# Patient Record
Sex: FEMALE | URBAN - METROPOLITAN AREA
[De-identification: names, ages, dates, MRNs, and addresses within clinical notes are randomized per-mention and may not be internally consistent; named-entity substitution may affect disease eponyms.]

---

## 2019-07-22 ENCOUNTER — APPOINTMENT (RX ONLY)
Dept: URBAN - METROPOLITAN AREA CLINIC 44 | Facility: CLINIC | Age: 39
Setting detail: DERMATOLOGY
End: 2019-07-22

## 2019-07-22 DIAGNOSIS — L81.1 CHLOASMA: ICD-10-CM

## 2019-07-22 DIAGNOSIS — L30.9 DERMATITIS, UNSPECIFIED: ICD-10-CM

## 2019-07-22 PROCEDURE — ? ADDITIONAL NOTES

## 2019-07-22 PROCEDURE — ? COUNSELING

## 2019-07-22 PROCEDURE — 99213 OFFICE O/P EST LOW 20 MIN: CPT

## 2019-07-22 ASSESSMENT — LOCATION ZONE DERM
LOCATION ZONE: FACE
LOCATION ZONE: TRUNK

## 2019-07-22 ASSESSMENT — LOCATION SIMPLE DESCRIPTION DERM
LOCATION SIMPLE: RIGHT UPPER BACK
LOCATION SIMPLE: RIGHT CHEEK
LOCATION SIMPLE: LEFT CHEEK

## 2019-07-22 ASSESSMENT — LOCATION DETAILED DESCRIPTION DERM
LOCATION DETAILED: LEFT CENTRAL MALAR CHEEK
LOCATION DETAILED: RIGHT SUPERIOR MEDIAL UPPER BACK
LOCATION DETAILED: RIGHT CENTRAL MALAR CHEEK

## 2019-07-22 NOTE — PROCEDURE: ADDITIONAL NOTES
Additional Notes: Ddx includes notalgia paresthetica vs ashy dermatitis. Patient declined biopsy and treatment.
Detail Level: Simple
Additional Notes: Patient declined treatment

## 2021-01-21 ENCOUNTER — WEB ENCOUNTER (OUTPATIENT)
Dept: URBAN - NONMETROPOLITAN AREA CLINIC 4 | Facility: CLINIC | Age: 41
End: 2021-01-21

## 2021-01-21 ENCOUNTER — OFFICE VISIT (OUTPATIENT)
Dept: URBAN - NONMETROPOLITAN AREA CLINIC 4 | Facility: CLINIC | Age: 41
End: 2021-01-21
Payer: COMMERCIAL

## 2021-01-21 VITALS
DIASTOLIC BLOOD PRESSURE: 69 MMHG | SYSTOLIC BLOOD PRESSURE: 100 MMHG | WEIGHT: 117 LBS | HEART RATE: 78 BPM | HEIGHT: 58 IN | BODY MASS INDEX: 24.56 KG/M2 | TEMPERATURE: 97.5 F

## 2021-01-21 DIAGNOSIS — R14.0 BLOATING: ICD-10-CM

## 2021-01-21 DIAGNOSIS — R10.10 UPPER ABDOMINAL PAIN: ICD-10-CM

## 2021-01-21 PROCEDURE — 99214 OFFICE O/P EST MOD 30 MIN: CPT | Performed by: INTERNAL MEDICINE

## 2021-01-21 PROCEDURE — 74177 CT ABD & PELVIS W/CONTRAST: CPT | Performed by: INTERNAL MEDICINE

## 2021-01-21 NOTE — HPI-TODAY'S VISIT:
Pt reports that she had dizziness, spinning, abdominal bloating and upper abd pain on the left upper abdomen that began before Xmas.  Pt reports that she started at the time of eating omelet with trent. Did have smoked sausage later and started burping and bloating.  Pt reports that she does still have her GB.  Pt reports that she took omeprazole and had worsening bloatingl  Pt eliminated coffee with no improvement.  Denies constipation

## 2021-01-22 ENCOUNTER — TELEPHONE ENCOUNTER (OUTPATIENT)
Dept: URBAN - METROPOLITAN AREA CLINIC 54 | Facility: CLINIC | Age: 41
End: 2021-01-22

## 2021-01-28 LAB
ADDITIONAL INFORMATION:: (no result)
AMYLASE: 66
COMMENT:: (no result)
DQ2 (DQA1 0501/0505,DQB1 02XX): POSITIVE
DQ8 (DQA1 03XX, DQB1 0302): NEGATIVE
LIPASE: 45

## 2021-01-31 LAB — H. PYLORI STOOL AG, EIA: NEGATIVE

## 2021-03-04 ENCOUNTER — OFFICE VISIT (OUTPATIENT)
Dept: URBAN - NONMETROPOLITAN AREA CLINIC 4 | Facility: CLINIC | Age: 41
End: 2021-03-04

## 2021-04-29 ENCOUNTER — OFFICE VISIT (OUTPATIENT)
Dept: URBAN - NONMETROPOLITAN AREA CLINIC 4 | Facility: CLINIC | Age: 41
End: 2021-04-29

## 2022-08-08 ENCOUNTER — DASHBOARD ENCOUNTERS (OUTPATIENT)
Age: 42
End: 2022-08-08

## 2022-08-08 ENCOUNTER — OFFICE VISIT (OUTPATIENT)
Dept: URBAN - METROPOLITAN AREA CLINIC 54 | Facility: CLINIC | Age: 42
End: 2022-08-08
Payer: COMMERCIAL

## 2022-08-08 VITALS
SYSTOLIC BLOOD PRESSURE: 102 MMHG | HEART RATE: 80 BPM | WEIGHT: 122.4 LBS | HEIGHT: 58 IN | DIASTOLIC BLOOD PRESSURE: 71 MMHG | TEMPERATURE: 96.8 F | BODY MASS INDEX: 25.69 KG/M2

## 2022-08-08 DIAGNOSIS — K82.4 GALLBLADDER POLYP: ICD-10-CM

## 2022-08-08 DIAGNOSIS — K90.0 CELIAC DISEASE: ICD-10-CM

## 2022-08-08 DIAGNOSIS — R10.13 DYSPEPSIA: ICD-10-CM

## 2022-08-08 DIAGNOSIS — K58.0 IRRITABLE BOWEL SYNDROME WITH DIARRHEA: ICD-10-CM

## 2022-08-08 PROBLEM — 197125005: Status: ACTIVE | Noted: 2022-08-08

## 2022-08-08 PROCEDURE — 99214 OFFICE O/P EST MOD 30 MIN: CPT | Performed by: INTERNAL MEDICINE

## 2022-08-08 NOTE — HPI-TODAY'S VISIT:
42-year-old lady with possible celiac disease on gluten-free diet who presents with chronic abdominal pain.  Patient reports that she said abdominal epigastric pain for last 5 years.  Its intermittent.  She denies that is postprandial.  She recently had an abdominal ultrasound that showed a 4 mm gallbladder polyp.  This was performed June 21, 2022.  Patient also reports that she has alternating bowel habits.  Her normal baseline bowel frequency is 1 to 2/day.  It has increased in frequency to 4 to 5/day of loose liquidy stool.  She denies any blood or black tarry stool.  Patient's been on a gluten-free diet for many years.  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  Patient's last colonoscopy and EGD were reportedly in 2011.

## 2022-08-09 PROBLEM — 396331005: Status: ACTIVE | Noted: 2022-08-08

## 2022-08-26 LAB
A/G RATIO: 2
ALBUMIN: 4.5
ALKALINE PHOSPHATASE: 43
ALT (SGPT): 10
AST (SGOT): 13
BILIRUBIN, TOTAL: 0.5
BUN/CREATININE RATIO: (no result)
BUN: 14
CALCIUM: 9.5
CARBON DIOXIDE, TOTAL: 25
CHLORIDE: 103
CREATININE: 0.65
EGFR: 113
GLOBULIN, TOTAL: 2.3
GLUCOSE: 99
HEMATOCRIT: 39.2
HEMOGLOBIN: 13
IMMUNOGLOBULIN A, QN, SERUM: 123
INTERPRETATION: (no result)
MCH: 29.9
MCHC: 33.2
MCV: 90.1
MPV: 10
PLATELET COUNT: 307
POTASSIUM: 4.4
PROTEIN, TOTAL: 6.8
RDW: 11.7
RED BLOOD CELL COUNT: 4.35
SODIUM: 138
T-TRANSGLUTAMINASE (TTG) IGA: <1
TSH: 1.25
WHITE BLOOD CELL COUNT: 5.2

## 2022-08-29 ENCOUNTER — OFFICE VISIT (OUTPATIENT)
Dept: URBAN - METROPOLITAN AREA MEDICAL CENTER 48 | Facility: MEDICAL CENTER | Age: 42
End: 2022-08-29
Payer: COMMERCIAL

## 2022-08-29 DIAGNOSIS — K82.8 ADENOMYOSIS OF GALLBLADDER: ICD-10-CM

## 2022-08-29 DIAGNOSIS — K29.60 ADENOPAPILLOMATOSIS GASTRICA: ICD-10-CM

## 2022-08-29 PROCEDURE — 43259 EGD US EXAM DUODENUM/JEJUNUM: CPT | Performed by: INTERNAL MEDICINE

## 2022-08-29 PROCEDURE — 43239 EGD BIOPSY SINGLE/MULTIPLE: CPT | Performed by: INTERNAL MEDICINE

## 2022-08-30 ENCOUNTER — TELEPHONE ENCOUNTER (OUTPATIENT)
Dept: URBAN - METROPOLITAN AREA CLINIC 54 | Facility: CLINIC | Age: 42
End: 2022-08-30

## 2022-10-03 ENCOUNTER — WEB ENCOUNTER (OUTPATIENT)
Dept: URBAN - METROPOLITAN AREA CLINIC 54 | Facility: CLINIC | Age: 42
End: 2022-10-03

## 2022-11-03 ENCOUNTER — OFFICE VISIT (OUTPATIENT)
Dept: URBAN - METROPOLITAN AREA CLINIC 54 | Facility: CLINIC | Age: 42
End: 2022-11-03

## 2022-12-05 ENCOUNTER — TELEPHONE ENCOUNTER (OUTPATIENT)
Dept: URBAN - METROPOLITAN AREA CLINIC 54 | Facility: CLINIC | Age: 42
End: 2022-12-05